# Patient Record
Sex: MALE | Race: WHITE | Employment: OTHER | URBAN - METROPOLITAN AREA
[De-identification: names, ages, dates, MRNs, and addresses within clinical notes are randomized per-mention and may not be internally consistent; named-entity substitution may affect disease eponyms.]

---

## 2019-08-01 ENCOUNTER — OFFICE VISIT (OUTPATIENT)
Dept: INTERNAL MEDICINE CLINIC | Age: 54
End: 2019-08-01

## 2019-08-01 VITALS
HEART RATE: 90 BPM | TEMPERATURE: 97.6 F | OXYGEN SATURATION: 95 % | RESPIRATION RATE: 18 BRPM | SYSTOLIC BLOOD PRESSURE: 145 MMHG | DIASTOLIC BLOOD PRESSURE: 103 MMHG | WEIGHT: 229.1 LBS

## 2019-08-01 DIAGNOSIS — G47.00 INSOMNIA, UNSPECIFIED TYPE: Primary | ICD-10-CM

## 2019-08-01 PROCEDURE — 99213 OFFICE O/P EST LOW 20 MIN: CPT | Performed by: STUDENT IN AN ORGANIZED HEALTH CARE EDUCATION/TRAINING PROGRAM

## 2019-08-01 RX ORDER — TRAZODONE HYDROCHLORIDE 50 MG/1
50 TABLET ORAL NIGHTLY
Qty: 7 TABLET | Refills: 0 | Status: SHIPPED | OUTPATIENT
Start: 2019-08-01 | End: 2019-08-08

## 2019-08-01 ASSESSMENT — ENCOUNTER SYMPTOMS
CHEST TIGHTNESS: 0
ABDOMINAL PAIN: 0
SHORTNESS OF BREATH: 0

## 2019-08-01 NOTE — PROGRESS NOTES
Visiting from [place]. States he is  Unable to sleep since he came to [place]. Melatonin no help. He is to Return to [facility] tomorrow. He is driving . He needs to sleep tonight. States he is a worrier . He feels anxious.

## 2019-08-01 NOTE — PROGRESS NOTES
Department Of Internal Medicine  Outpatient Clinic  Resident Note    Patient:  Queen Rigo                                               : 1965  Age: 47 y.o. MRN: [id number]  Date : 2019    History Obtained From:  Patient    REASON FOR VISIT:  New patient, can't sleep    HISTORY OF PRESENT ILLNESS:   The patient is a 47 y.o. male w PMH of anxiety and sleep difficulty. Pt presents to clinic requesting a sleep aid. He is visiting a friend from out of town. He is from [place] and driving back tomorrow. States he hasn't not sleep well for the past few days and needs some sleep before his drive home. State he has only tried melatonin 10 mg which helps him fall asleep but does not help him sleep through the night. He frequently wakes up and states he is a chronic worrier. He has not seen a doctor in a long time. He has never had his thyroid checked or any other labs. Reports taking testosterone supplements for energy in the past but has never had his levels checked. He has cut back on coffee and does not drink any other caffeine beverages. Past Medical History:    No past medical history on file. Past Surgical History:    No past surgical history on file. Family History:   No family history on file. Social History:   TOBACCO:   has no tobacco history on file. ETOH:   has no alcohol history on file. OCCUPATION:      Allergies:  Patient has no allergy information on record. Current Medications:    Prior to Admission medications    Not on File       Review of Systems   Constitutional: Positive for activity change and diaphoresis. Negative for appetite change and chills. Respiratory: Negative for chest tightness and shortness of breath. Cardiovascular: Negative for chest pain and palpitations. Gastrointestinal: Negative for abdominal pain. Endocrine: Negative for cold intolerance, heat intolerance, polydipsia, polyphagia and polyuria.    Psychiatric/Behavioral: Positive